# Patient Record
Sex: FEMALE | NOT HISPANIC OR LATINO | URBAN - METROPOLITAN AREA
[De-identification: names, ages, dates, MRNs, and addresses within clinical notes are randomized per-mention and may not be internally consistent; named-entity substitution may affect disease eponyms.]

---

## 2018-03-06 ENCOUNTER — INPATIENT (INPATIENT)
Facility: HOSPITAL | Age: 48
LOS: 1 days | Discharge: HOME | End: 2018-03-08
Attending: SURGERY | Admitting: SURGERY
Payer: COMMERCIAL

## 2018-03-06 ENCOUNTER — TRANSCRIPTION ENCOUNTER (OUTPATIENT)
Age: 48
End: 2018-03-06

## 2018-03-06 ENCOUNTER — RESULT REVIEW (OUTPATIENT)
Age: 48
End: 2018-03-06

## 2018-03-06 VITALS
TEMPERATURE: 98 F | RESPIRATION RATE: 17 BRPM | HEART RATE: 105 BPM | SYSTOLIC BLOOD PRESSURE: 148 MMHG | OXYGEN SATURATION: 100 % | DIASTOLIC BLOOD PRESSURE: 78 MMHG

## 2018-03-06 DIAGNOSIS — Z98.890 OTHER SPECIFIED POSTPROCEDURAL STATES: Chronic | ICD-10-CM

## 2018-03-06 DIAGNOSIS — K37 UNSPECIFIED APPENDICITIS: ICD-10-CM

## 2018-03-06 LAB
ALBUMIN SERPL ELPH-MCNC: 4 G/DL — SIGNIFICANT CHANGE UP (ref 3.3–5)
ALP SERPL-CCNC: 79 U/L — SIGNIFICANT CHANGE UP (ref 40–120)
ALT FLD-CCNC: 13 U/L — SIGNIFICANT CHANGE UP (ref 4–33)
APPEARANCE UR: SIGNIFICANT CHANGE UP
APTT BLD: 30.1 SEC — SIGNIFICANT CHANGE UP (ref 27.5–37.4)
AST SERPL-CCNC: 16 U/L — SIGNIFICANT CHANGE UP (ref 4–32)
BASE EXCESS BLDV CALC-SCNC: 4.4 MMOL/L — SIGNIFICANT CHANGE UP
BASOPHILS # BLD AUTO: 0.07 K/UL — SIGNIFICANT CHANGE UP (ref 0–0.2)
BASOPHILS NFR BLD AUTO: 0.5 % — SIGNIFICANT CHANGE UP (ref 0–2)
BILIRUB SERPL-MCNC: 0.7 MG/DL — SIGNIFICANT CHANGE UP (ref 0.2–1.2)
BILIRUB UR-MCNC: NEGATIVE — SIGNIFICANT CHANGE UP
BLD GP AB SCN SERPL QL: NEGATIVE — SIGNIFICANT CHANGE UP
BLOOD GAS VENOUS - CREATININE: 0.73 MG/DL — SIGNIFICANT CHANGE UP (ref 0.5–1.3)
BLOOD UR QL VISUAL: HIGH
BUN SERPL-MCNC: 9 MG/DL — SIGNIFICANT CHANGE UP (ref 7–23)
CALCIUM SERPL-MCNC: 9.5 MG/DL — SIGNIFICANT CHANGE UP (ref 8.4–10.5)
CHLORIDE BLDV-SCNC: 103 MMOL/L — SIGNIFICANT CHANGE UP (ref 96–108)
CHLORIDE SERPL-SCNC: 99 MMOL/L — SIGNIFICANT CHANGE UP (ref 98–107)
CO2 SERPL-SCNC: 26 MMOL/L — SIGNIFICANT CHANGE UP (ref 22–31)
COLOR SPEC: YELLOW — SIGNIFICANT CHANGE UP
CREAT SERPL-MCNC: 0.74 MG/DL — SIGNIFICANT CHANGE UP (ref 0.5–1.3)
EOSINOPHIL # BLD AUTO: 0.07 K/UL — SIGNIFICANT CHANGE UP (ref 0–0.5)
EOSINOPHIL NFR BLD AUTO: 0.5 % — SIGNIFICANT CHANGE UP (ref 0–6)
GAS PNL BLDV: 135 MMOL/L — LOW (ref 136–146)
GLUCOSE BLDV-MCNC: 87 — SIGNIFICANT CHANGE UP (ref 70–99)
GLUCOSE SERPL-MCNC: 88 MG/DL — SIGNIFICANT CHANGE UP (ref 70–99)
GLUCOSE UR-MCNC: NEGATIVE — SIGNIFICANT CHANGE UP
HCO3 BLDV-SCNC: 27 MMOL/L — SIGNIFICANT CHANGE UP (ref 20–27)
HCT VFR BLD CALC: 32.9 % — LOW (ref 34.5–45)
HCT VFR BLDV CALC: 31.8 % — LOW (ref 34.5–45)
HGB BLD-MCNC: 10.1 G/DL — LOW (ref 11.5–15.5)
HGB BLDV-MCNC: 10.3 G/DL — LOW (ref 11.5–15.5)
HYALINE CASTS # UR AUTO: SIGNIFICANT CHANGE UP (ref 0–?)
IMM GRANULOCYTES # BLD AUTO: 0.06 # — SIGNIFICANT CHANGE UP
IMM GRANULOCYTES NFR BLD AUTO: 0.4 % — SIGNIFICANT CHANGE UP (ref 0–1.5)
INR BLD: 1.15 — SIGNIFICANT CHANGE UP (ref 0.88–1.17)
KETONES UR-MCNC: NEGATIVE — SIGNIFICANT CHANGE UP
LACTATE BLDV-MCNC: 1.1 MMOL/L — SIGNIFICANT CHANGE UP (ref 0.5–2)
LEUKOCYTE ESTERASE UR-ACNC: SIGNIFICANT CHANGE UP
LIDOCAIN IGE QN: 32.4 U/L — SIGNIFICANT CHANGE UP (ref 7–60)
LYMPHOCYTES # BLD AUTO: 1.46 K/UL — SIGNIFICANT CHANGE UP (ref 1–3.3)
LYMPHOCYTES # BLD AUTO: 10.6 % — LOW (ref 13–44)
MCHC RBC-ENTMCNC: 24.6 PG — LOW (ref 27–34)
MCHC RBC-ENTMCNC: 30.7 % — LOW (ref 32–36)
MCV RBC AUTO: 80 FL — SIGNIFICANT CHANGE UP (ref 80–100)
MONOCYTES # BLD AUTO: 1.14 K/UL — HIGH (ref 0–0.9)
MONOCYTES NFR BLD AUTO: 8.3 % — SIGNIFICANT CHANGE UP (ref 2–14)
MUCOUS THREADS # UR AUTO: SIGNIFICANT CHANGE UP
NEUTROPHILS # BLD AUTO: 10.91 K/UL — HIGH (ref 1.8–7.4)
NEUTROPHILS NFR BLD AUTO: 79.7 % — HIGH (ref 43–77)
NITRITE UR-MCNC: NEGATIVE — SIGNIFICANT CHANGE UP
NON-SQ EPI CELLS # UR AUTO: <1 — SIGNIFICANT CHANGE UP
NRBC # FLD: 0 — SIGNIFICANT CHANGE UP
PCO2 BLDV: 52 MMHG — HIGH (ref 41–51)
PH BLDV: 7.37 PH — SIGNIFICANT CHANGE UP (ref 7.32–7.43)
PH UR: 6 — SIGNIFICANT CHANGE UP (ref 4.6–8)
PLATELET # BLD AUTO: 308 K/UL — SIGNIFICANT CHANGE UP (ref 150–400)
PMV BLD: SIGNIFICANT CHANGE UP FL (ref 7–13)
PO2 BLDV: < 24 MMHG — LOW (ref 35–40)
POTASSIUM BLDV-SCNC: 4 MMOL/L — SIGNIFICANT CHANGE UP (ref 3.4–4.5)
POTASSIUM SERPL-MCNC: 4.3 MMOL/L — SIGNIFICANT CHANGE UP (ref 3.5–5.3)
POTASSIUM SERPL-SCNC: 4.3 MMOL/L — SIGNIFICANT CHANGE UP (ref 3.5–5.3)
PROT SERPL-MCNC: 7.7 G/DL — SIGNIFICANT CHANGE UP (ref 6–8.3)
PROT UR-MCNC: 100 MG/DL — HIGH
PROTHROM AB SERPL-ACNC: 13.3 SEC — HIGH (ref 9.8–13.1)
RBC # BLD: 4.11 M/UL — SIGNIFICANT CHANGE UP (ref 3.8–5.2)
RBC # FLD: 19.2 % — HIGH (ref 10.3–14.5)
RBC CASTS # UR COMP ASSIST: SIGNIFICANT CHANGE UP (ref 0–?)
RH IG SCN BLD-IMP: POSITIVE — SIGNIFICANT CHANGE UP
SAO2 % BLDV: 32.7 % — LOW (ref 60–85)
SODIUM SERPL-SCNC: 138 MMOL/L — SIGNIFICANT CHANGE UP (ref 135–145)
SP GR SPEC: 1.03 — SIGNIFICANT CHANGE UP (ref 1–1.04)
SQUAMOUS # UR AUTO: SIGNIFICANT CHANGE UP
URATE CRY FLD QL MICRO: SIGNIFICANT CHANGE UP (ref 0–0)
UROBILINOGEN FLD QL: NORMAL MG/DL — SIGNIFICANT CHANGE UP
WBC # BLD: 13.71 K/UL — HIGH (ref 3.8–10.5)
WBC # FLD AUTO: 13.71 K/UL — HIGH (ref 3.8–10.5)
WBC UR QL: HIGH (ref 0–?)

## 2018-03-06 PROCEDURE — 44970 LAPAROSCOPY APPENDECTOMY: CPT | Mod: GC

## 2018-03-06 PROCEDURE — 99221 1ST HOSP IP/OBS SF/LOW 40: CPT | Mod: 57,25,GC

## 2018-03-06 RX ORDER — ONDANSETRON 8 MG/1
4 TABLET, FILM COATED ORAL ONCE
Qty: 0 | Refills: 0 | Status: COMPLETED | OUTPATIENT
Start: 2018-03-06 | End: 2018-03-06

## 2018-03-06 RX ORDER — SODIUM CHLORIDE 9 MG/ML
1000 INJECTION INTRAMUSCULAR; INTRAVENOUS; SUBCUTANEOUS ONCE
Qty: 0 | Refills: 0 | Status: COMPLETED | OUTPATIENT
Start: 2018-03-06 | End: 2018-03-06

## 2018-03-06 RX ORDER — AMLODIPINE BESYLATE 2.5 MG/1
1 TABLET ORAL
Qty: 0 | Refills: 0 | COMMUNITY

## 2018-03-06 RX ORDER — CEFOTETAN DISODIUM 1 G
2 VIAL (EA) INJECTION EVERY 12 HOURS
Qty: 0 | Refills: 0 | Status: DISCONTINUED | OUTPATIENT
Start: 2018-03-06 | End: 2018-03-07

## 2018-03-06 RX ORDER — CEFOTETAN DISODIUM 1 G
1 VIAL (EA) INJECTION ONCE
Qty: 0 | Refills: 0 | Status: COMPLETED | OUTPATIENT
Start: 2018-03-06 | End: 2018-03-06

## 2018-03-06 RX ORDER — MORPHINE SULFATE 50 MG/1
4 CAPSULE, EXTENDED RELEASE ORAL ONCE
Qty: 0 | Refills: 0 | Status: DISCONTINUED | OUTPATIENT
Start: 2018-03-06 | End: 2018-03-06

## 2018-03-06 RX ORDER — AMLODIPINE BESYLATE 2.5 MG/1
2.5 TABLET ORAL DAILY
Qty: 0 | Refills: 0 | Status: DISCONTINUED | OUTPATIENT
Start: 2018-03-06 | End: 2018-03-08

## 2018-03-06 RX ORDER — ENOXAPARIN SODIUM 100 MG/ML
40 INJECTION SUBCUTANEOUS DAILY
Qty: 0 | Refills: 0 | Status: DISCONTINUED | OUTPATIENT
Start: 2018-03-06 | End: 2018-03-08

## 2018-03-06 RX ORDER — SODIUM CHLORIDE 9 MG/ML
1000 INJECTION, SOLUTION INTRAVENOUS
Qty: 0 | Refills: 0 | Status: DISCONTINUED | OUTPATIENT
Start: 2018-03-06 | End: 2018-03-07

## 2018-03-06 RX ADMIN — ONDANSETRON 4 MILLIGRAM(S): 8 TABLET, FILM COATED ORAL at 16:11

## 2018-03-06 RX ADMIN — Medication 100 GRAM(S): at 18:22

## 2018-03-06 RX ADMIN — MORPHINE SULFATE 4 MILLIGRAM(S): 50 CAPSULE, EXTENDED RELEASE ORAL at 16:11

## 2018-03-06 RX ADMIN — SODIUM CHLORIDE 1000 MILLILITER(S): 9 INJECTION INTRAMUSCULAR; INTRAVENOUS; SUBCUTANEOUS at 16:11

## 2018-03-06 RX ADMIN — SODIUM CHLORIDE 100 MILLILITER(S): 9 INJECTION, SOLUTION INTRAVENOUS at 22:54

## 2018-03-06 NOTE — H&P ADULT - HISTORY OF PRESENT ILLNESS
48F with newly diagnosed hypertension presents with abdominal pain since Saturday night. Patient thought she got a GI bug from a bridal shower and had vomiting and diffuse abdominal pain. On Sunday the vomiting improved and the pain localized to the right lower quadrant. It was not associated with fevers or chills. [facility] and was sent for a CT scan. She was called today to be informed of the results of the CT scan and came to the ED.   Patient has never had this pain before. Had a hysterectomy years ago for fibroids.

## 2018-03-06 NOTE — ED ADULT TRIAGE NOTE - CHIEF COMPLAINT QUOTE
pt c/o RLQ pain since Saturday. went to [facility] and had a CT preformed, arrived with CD that has results, unsure of the results herself. denies urinary issues.

## 2018-03-06 NOTE — ED PROVIDER NOTE - ATTENDING CONTRIBUTION TO CARE
48F with hx hysterectomy p/w RLQ pain x a few days. Also with few episodes of nbnb emesis. Pain severe, RLQ, constant, no alleviating or exacerbating factors. Seen at urgent care and had CT performed, but pt does not know results, has disc. Denies diarrhea, melena, hematochezia. Denies dysuria, hematuria. Denies vag d/c or bleeding. Denies cp, sob, f/c.    PE: NAD, NCAT, MMM, Trachea midline, Normal conjunctiva, lungs CTAB, S1/S2 RRR, Normal perfusion, Abdomen Soft, +RLQ ttp with ttp over mcburney's point, No rebound/guarding, No LE edema, No deformity of extremities, No rashes,  No focal motor or sensory deficits.    48F with RLQ pain x a few days. WIth RLQ ttp, most concerning for appendicitis. Also consider diverticulitis, colitis, or other intra-abd pathology. Much lower suspicion  or GYN pathology. Pt has CT with her, will upload and ask radiology to read. To obtain pre-op labs for likely appendicitis, give abx as necessary, analgesia and antiemetics as necessary. Surgery consult as necessary. Will likely require admission. - Galdino Magdaleno MD

## 2018-03-06 NOTE — ED ADULT NURSE NOTE - OBJECTIVE STATEMENT
Pt c/o RLQ pain x 3 days. In NAD. C/o nausea. Denies vomiting/diarrhea today. Denies urinary symptoms, fever/chills. Hx: Hysterectomy. Urine appears dark in color. Evaluated by MD. Labs drawn & sent. Will continue to monitor.

## 2018-03-06 NOTE — ED PROVIDER NOTE - OBJECTIVE STATEMENT
48 year-old female with history of hysterectomy p/w RLQ abdominal pain x 3 days.  Non-radiating; not affected by PO intake.  Started with repeated episodes of NBNB emesis.  Went to UC yesterday - had a CT scan done and the patient does not have the results.  No fevers, chills, chest pain, shortness of breath, dysuria.  No history of kidney stones; has not had pain like this before.  Having regular BM - no diarrhea or melena.  Has not taken anything for the pain.

## 2018-03-06 NOTE — ED ADULT NURSE NOTE - CHIEF COMPLAINT QUOTE
pt c/o RLQ pain since Saturday. went to [facility] and had a CT preformed, arrived with CD that has results, unsure of the results herself. denies urinary issues.

## 2018-03-06 NOTE — H&P ADULT - NSHPPHYSICALEXAM_GEN_ALL_CORE
General: WN/WD NAD  Neurology: A&Ox3, nonfocal, SHERMAN x 4  Head:  Normocephalic, atraumatic  ENT:  Mucosa moist, no ulcerations  Neck:  Supple, no sinuses or palpable masses  Lymphatic:  No palpable cervical, supraclavicular, axillary or inguinal adenopathy  Respiratory: CTA B/L  CV: RRR, S1S2, no murmur  Abdominal: Obese, Soft, RLQ pain   MSK: No edema, + peripheral pulses, FROM all 4 extremity

## 2018-03-06 NOTE — ED PROVIDER NOTE - MEDICAL DECISION MAKING DETAILS
48 year-old female with history of hysterectomy p/w RLQ abdominal pain x 3 days likely appendicitis.  DDX: ovarian torsion, kidney stone, ovarian fibroid, UTI/pyelo.  Plan to upload outpatient imaging for evaluation.  Ordering basic labs, pain control, fluids and reassess.

## 2018-03-06 NOTE — H&P ADULT - ASSESSMENT
48F presents with acute appendicitis    -cefotetan  -NPO/IVF  -added on to OR for tonight  discussed with Dr. Glynn

## 2018-03-06 NOTE — ED PROVIDER NOTE - PROGRESS NOTE DETAILS
Harsha COONEY: CT scan shows presence of appendicitis; surg consulted and admitted for further management

## 2018-03-06 NOTE — ED PROVIDER NOTE - PHYSICAL EXAMINATION
*Gen: NAD, AAO*3  *HEENT: NC/AT, MMM, airway patent, trachea midline  *CV: RRR, S1/S2 present, no murmurs/rubs/gallops  *Resp: no respiratory distress, LCTAB, no wheezing/rales/rhonchi  *Abd: non-distended, TTP RLQ  *Neuro: no focal neuro deficits, moving all limbs appropriately  *Extremities: no gross deformity  *Skin: no rashes, no wounds   ~ Arturo Mcleod M.D.

## 2018-03-07 ENCOUNTER — TRANSCRIPTION ENCOUNTER (OUTPATIENT)
Age: 48
End: 2018-03-07

## 2018-03-07 LAB — SPECIMEN SOURCE: SIGNIFICANT CHANGE UP

## 2018-03-07 PROCEDURE — 88304 TISSUE EXAM BY PATHOLOGIST: CPT | Mod: 26

## 2018-03-07 RX ORDER — ACETAMINOPHEN 500 MG
650 TABLET ORAL EVERY 6 HOURS
Qty: 0 | Refills: 0 | Status: DISCONTINUED | OUTPATIENT
Start: 2018-03-07 | End: 2018-03-08

## 2018-03-07 RX ORDER — HYDROMORPHONE HYDROCHLORIDE 2 MG/ML
0.5 INJECTION INTRAMUSCULAR; INTRAVENOUS; SUBCUTANEOUS
Qty: 0 | Refills: 0 | Status: DISCONTINUED | OUTPATIENT
Start: 2018-03-07 | End: 2018-03-07

## 2018-03-07 RX ORDER — FENTANYL CITRATE 50 UG/ML
50 INJECTION INTRAVENOUS
Qty: 0 | Refills: 0 | Status: DISCONTINUED | OUTPATIENT
Start: 2018-03-07 | End: 2018-03-07

## 2018-03-07 RX ORDER — CIPROFLOXACIN LACTATE 400MG/40ML
500 VIAL (ML) INTRAVENOUS EVERY 12 HOURS
Qty: 0 | Refills: 0 | Status: DISCONTINUED | OUTPATIENT
Start: 2018-03-07 | End: 2018-03-08

## 2018-03-07 RX ORDER — METRONIDAZOLE 500 MG
500 TABLET ORAL EVERY 8 HOURS
Qty: 0 | Refills: 0 | Status: DISCONTINUED | OUTPATIENT
Start: 2018-03-07 | End: 2018-03-08

## 2018-03-07 RX ORDER — DOCUSATE SODIUM 100 MG
100 CAPSULE ORAL THREE TIMES A DAY
Qty: 0 | Refills: 0 | Status: DISCONTINUED | OUTPATIENT
Start: 2018-03-07 | End: 2018-03-08

## 2018-03-07 RX ORDER — CIPROFLOXACIN LACTATE 400MG/40ML
1 VIAL (ML) INTRAVENOUS
Qty: 8 | Refills: 0 | OUTPATIENT
Start: 2018-03-07 | End: 2018-03-10

## 2018-03-07 RX ORDER — OXYCODONE AND ACETAMINOPHEN 5; 325 MG/1; MG/1
1 TABLET ORAL EVERY 6 HOURS
Qty: 0 | Refills: 0 | Status: DISCONTINUED | OUTPATIENT
Start: 2018-03-07 | End: 2018-03-08

## 2018-03-07 RX ORDER — DOCUSATE SODIUM 100 MG
1 CAPSULE ORAL
Qty: 0 | Refills: 0 | COMMUNITY
Start: 2018-03-07

## 2018-03-07 RX ORDER — OXYCODONE HYDROCHLORIDE 5 MG/1
5 TABLET ORAL EVERY 4 HOURS
Qty: 0 | Refills: 0 | Status: DISCONTINUED | OUTPATIENT
Start: 2018-03-07 | End: 2018-03-07

## 2018-03-07 RX ORDER — METRONIDAZOLE 500 MG
1 TABLET ORAL
Qty: 12 | Refills: 0 | OUTPATIENT
Start: 2018-03-07 | End: 2018-03-10

## 2018-03-07 RX ORDER — ONDANSETRON 8 MG/1
4 TABLET, FILM COATED ORAL ONCE
Qty: 0 | Refills: 0 | Status: DISCONTINUED | OUTPATIENT
Start: 2018-03-07 | End: 2018-03-07

## 2018-03-07 RX ADMIN — OXYCODONE AND ACETAMINOPHEN 1 TABLET(S): 5; 325 TABLET ORAL at 06:00

## 2018-03-07 RX ADMIN — OXYCODONE AND ACETAMINOPHEN 1 TABLET(S): 5; 325 TABLET ORAL at 13:40

## 2018-03-07 RX ADMIN — AMLODIPINE BESYLATE 2.5 MILLIGRAM(S): 2.5 TABLET ORAL at 05:40

## 2018-03-07 RX ADMIN — Medication 500 MILLIGRAM(S): at 17:05

## 2018-03-07 RX ADMIN — Medication 500 MILLIGRAM(S): at 05:40

## 2018-03-07 RX ADMIN — OXYCODONE AND ACETAMINOPHEN 1 TABLET(S): 5; 325 TABLET ORAL at 14:40

## 2018-03-07 RX ADMIN — SODIUM CHLORIDE 100 MILLILITER(S): 9 INJECTION, SOLUTION INTRAVENOUS at 03:28

## 2018-03-07 RX ADMIN — Medication 500 MILLIGRAM(S): at 13:40

## 2018-03-07 RX ADMIN — Medication 100 MILLIGRAM(S): at 13:40

## 2018-03-07 RX ADMIN — Medication 100 MILLIGRAM(S): at 05:40

## 2018-03-07 RX ADMIN — OXYCODONE AND ACETAMINOPHEN 1 TABLET(S): 5; 325 TABLET ORAL at 05:44

## 2018-03-07 RX ADMIN — ENOXAPARIN SODIUM 40 MILLIGRAM(S): 100 INJECTION SUBCUTANEOUS at 13:40

## 2018-03-07 RX ADMIN — Medication 500 MILLIGRAM(S): at 22:16

## 2018-03-07 RX ADMIN — Medication 500 MILLIGRAM(S): at 05:39

## 2018-03-07 RX ADMIN — Medication 100 MILLIGRAM(S): at 22:16

## 2018-03-07 NOTE — PROGRESS NOTE ADULT - SUBJECTIVE AND OBJECTIVE BOX
Team B SURGERY PROGRESS NOTE    POST OPERATIVE DAY #: 1      SUBJECTIVE: Pt seen at examined at bedside. AVSS. No acute events overnight. Pain well controlled. OOB and ambulating. Denies fever, CP, SOB, and NV. Tolerating regular diet         Vital Signs Last 24 Hrs  T(C): 36.7 (07 Mar 2018 09:35), Max: 37.3 (06 Mar 2018 19:52)  T(F): 98.1 (07 Mar 2018 09:35), Max: 99.2 (06 Mar 2018 19:52)  HR: 82 (07 Mar 2018 09:35) (78 - 105)  BP: 125/76 (07 Mar 2018 09:35) (115/70 - 148/78)  BP(mean): --  RR: 18 (07 Mar 2018 09:35) (12 - 22)  SpO2: 96% (07 Mar 2018 09:35) (95% - 100%)    Physical Exam  General: awake, alert, NAD    Pulm: respirations unlabored, no increased WOB  Abdomen: soft, mildly distended, NT, incision c/d/i  Extremities: Grossly symmetric    I&O's Summary    06 Mar 2018 07:  -  07 Mar 2018 07:00  --------------------------------------------------------  IN: 750 mL / OUT: 1100 mL / NET: -350 mL      I&O's Detail    06 Mar 2018 07:  -  07 Mar 2018 07:00  --------------------------------------------------------  IN:    lactated ringers.: 600 mL    Oral Fluid: 150 mL  Total IN: 750 mL    OUT:    Voided: 1100 mL  Total OUT: 1100 mL    Total NET: -350 mL          MEDICATIONS  (STANDING):  amLODIPine   Tablet 2.5 milliGRAM(s) Oral daily  ciprofloxacin     Tablet 500 milliGRAM(s) Oral every 12 hours  docusate sodium 100 milliGRAM(s) Oral three times a day  enoxaparin Injectable 40 milliGRAM(s) SubCutaneous daily  metroNIDAZOLE    Tablet 500 milliGRAM(s) Oral every 8 hours    MEDICATIONS  (PRN):  oxyCODONE    5 mG/acetaminophen 325 mG 1 Tablet(s) Oral every 6 hours PRN Severe Pain (7 - 10)      LABS:                        10.1   13.71 )-----------( 308      ( 06 Mar 2018 15:59 )             32.9     03-06    138  |  99  |  9   ----------------------------<  88  4.3   |  26  |  0.74    Ca    9.5      06 Mar 2018 15:59    TPro  7.7  /  Alb  4.0  /  TBili  0.7  /  DBili  x   /  AST  16  /  ALT  13  /  AlkPhos  79  03-06    PT/INR - ( 06 Mar 2018 15:59 )   PT: 13.3 SEC;   INR: 1.15          PTT - ( 06 Mar 2018 15:59 )  PTT:30.1 SEC  Urinalysis Basic - ( 06 Mar 2018 15:59 )    Color: YELLOW / Appearance: HAZY / S.030 / pH: 6.0  Gluc: NEGATIVE / Ketone: NEGATIVE  / Bili: NEGATIVE / Urobili: NORMAL mg/dL   Blood: SMALL / Protein: 100 mg/dL / Nitrite: NEGATIVE   Leuk Esterase: TRACE / RBC: 10-25 / WBC 5-10   Sq Epi: OCC / Non Sq Epi: x / Bacteria: x        RADIOLOGY & ADDITIONAL STUDIES:  no new studies

## 2018-03-07 NOTE — DISCHARGE NOTE ADULT - MEDICATION SUMMARY - MEDICATIONS TO TAKE
I will START or STAY ON the medications listed below when I get home from the hospital:    metroNIDAZOLE 500 mg oral tablet  -- 1 tab(s) by mouth every 8 hours  -- Indication: For Appendicitis    oxyCODONE-acetaminophen 5 mg-325 mg oral tablet  -- 1 tab(s) by mouth every 4 to 6 hours, As Needed -Severe Pain (7 - 10) MDD:6   -- Indication: For Post op Pain    amLODIPine 2.5 mg oral tablet  -- 1 tab(s) by mouth once a day  -- Indication: For HTN    docusate sodium 100 mg oral capsule  -- 1 cap(s) by mouth 3 times a day  -- Indication: For Stool softener    ciprofloxacin 500 mg oral tablet  -- 1 tab(s) by mouth every 12 hours  -- Indication: For Appendicitis

## 2018-03-07 NOTE — DISCHARGE NOTE ADULT - HOSPITAL COURSE
48F with newly diagnosed hypertension presents with abdominal pain since Saturday night. Patient thought she got a GI bug from a bridal shower and had vomiting and diffuse abdominal pain. On Sunday the vomiting improved and the pain localized to the right lower quadrant. It was not associated with fevers or chills. [facility] and was sent for a CT scan. She was called today to be informed of the results of the CT scan and came to the ED which revealed appendicitis.    Patient has never had this pain before. Had a hysterectomy years ago for fibroids.     Patient was taken to the OR and is status post laparoscopic appendectomy.  Appendix found to be inflamed with pus but not perforated.     Patient did well post operatively.  She is tolerating diet and pain is well controlled. She is ambulating and voiding without difficulty.    Patient is stable for discharge home with pain medication and antibiotics.

## 2018-03-07 NOTE — DISCHARGE NOTE ADULT - PATIENT PORTAL LINK FT
You can access the [facility] Patient Portal, offered by [facility], by registering with the following website: [email or web address]

## 2018-03-07 NOTE — PROGRESS NOTE ADULT - ASSESSMENT
48F s/p lap appendectomy POD#1, doing well    - regular diet   - VTE ppx  - discharge today on Augmentin 4 days    [id number]

## 2018-03-07 NOTE — DISCHARGE NOTE ADULT - PLAN OF CARE
Continue current treatment status post laparoscopic appendectomy Please call  for follow up appointment in 1-2 weeks with Dr Glynn  -Please allow steri-strips to fall off on their own.  Ok to shower and rinse wound with warm soapy water.  Do not scrub wound, pat dry.   Please call the doctor immediately if you develop fever, chills, inability to tolerate liquid or food, diarrhea, nausea, vomiting or increased abdominal pain. Follow a regular diet. Do not drive or operate machinery while taking narcotic pain medication.   Please follow up with your surgeon and primary care doctor within 1 week of discharge. WOUND CARE: Please allow steri-strips to fall off on their own.   BATHING: Please do not submerge wound underwater. You may shower and/or sponge bathe. It is OK to wash drain wound site.  ACTIVITY: No heavy lifting or straining. Otherwise, you may return to your usual level of physical activity. If you are taking narcotic pain medication (such as Percocet) DO NOT drive a car, operate machinery or make important decisions.  DIET: Return to your usual diet.  NOTIFY YOUR SURGEON IF: You have any bleeding that does not stop, any pus draining from your wound(s), any fever (over 100.4 F) or chills, persistent nausea/vomiting, persistent diarrhea, or if your pain is not controlled on your discharge pain medications.  FOLLOW-UP: Please follow up with your primary care physician in one week regarding your hospitalization Please call  for follow up appointment in 1 week with Dr. Glynn. Please follow up with your primary care physician regarding your hospitalization

## 2018-03-07 NOTE — DISCHARGE NOTE ADULT - CARE PLAN
Principal Discharge DX:	Appendicitis  Goal:	status post laparoscopic appendectomy  Assessment and plan of treatment:	Please call  for follow up appointment in 1-2 weeks with Dr Glynn  -Please allow steri-strips to fall off on their own.  Ok to shower and rinse wound with warm soapy water.  Do not scrub wound, pat dry.   Please call the doctor immediately if you develop fever, chills, inability to tolerate liquid or food, diarrhea, nausea, vomiting or increased abdominal pain. Follow a regular diet. Do not drive or operate machinery while taking narcotic pain medication.   Please follow up with your surgeon and primary care doctor within 1 week of discharge.  Secondary Diagnosis:	Hypertension  Goal:	Continue current treatment Principal Discharge DX:	Appendicitis  Goal:	status post laparoscopic appendectomy  Assessment and plan of treatment:	WOUND CARE: Please allow steri-strips to fall off on their own.   BATHING: Please do not submerge wound underwater. You may shower and/or sponge bathe. It is OK to wash drain wound site.  ACTIVITY: No heavy lifting or straining. Otherwise, you may return to your usual level of physical activity. If you are taking narcotic pain medication (such as Percocet) DO NOT drive a car, operate machinery or make important decisions.  DIET: Return to your usual diet.  NOTIFY YOUR SURGEON IF: You have any bleeding that does not stop, any pus draining from your wound(s), any fever (over 100.4 F) or chills, persistent nausea/vomiting, persistent diarrhea, or if your pain is not controlled on your discharge pain medications.  FOLLOW-UP: Please follow up with your primary care physician in one week regarding your hospitalization Please call  for follow up appointment in 1 week with Dr. Glynn.  Secondary Diagnosis:	Hypertension  Goal:	Continue current treatment  Assessment and plan of treatment:	Please follow up with your primary care physician regarding your hospitalization

## 2018-03-07 NOTE — DISCHARGE NOTE ADULT - NS AS ACTIVITY OBS
Showering allowed/Stairs allowed/No Heavy lifting/straining/Do not make important decisions/Do not drive or operate machinery/Do not drive while taking pain medications./Walking-Indoors allowed/Walking-Outdoors allowed

## 2018-03-08 VITALS
DIASTOLIC BLOOD PRESSURE: 78 MMHG | HEART RATE: 81 BPM | RESPIRATION RATE: 17 BRPM | SYSTOLIC BLOOD PRESSURE: 146 MMHG | TEMPERATURE: 98 F | OXYGEN SATURATION: 96 %

## 2018-03-08 LAB — BACTERIA UR CULT: SIGNIFICANT CHANGE UP

## 2018-03-08 RX ORDER — CIPROFLOXACIN LACTATE 400MG/40ML
1 VIAL (ML) INTRAVENOUS
Qty: 6 | Refills: 0 | OUTPATIENT
Start: 2018-03-08 | End: 2018-03-10

## 2018-03-08 RX ORDER — METRONIDAZOLE 500 MG
1 TABLET ORAL
Qty: 9 | Refills: 0 | OUTPATIENT
Start: 2018-03-08 | End: 2018-03-10

## 2018-03-08 RX ADMIN — Medication 500 MILLIGRAM(S): at 13:07

## 2018-03-08 RX ADMIN — Medication 100 MILLIGRAM(S): at 06:31

## 2018-03-08 RX ADMIN — Medication 500 MILLIGRAM(S): at 06:31

## 2018-03-08 RX ADMIN — OXYCODONE AND ACETAMINOPHEN 1 TABLET(S): 5; 325 TABLET ORAL at 03:08

## 2018-03-08 RX ADMIN — OXYCODONE AND ACETAMINOPHEN 1 TABLET(S): 5; 325 TABLET ORAL at 04:08

## 2018-03-08 RX ADMIN — AMLODIPINE BESYLATE 2.5 MILLIGRAM(S): 2.5 TABLET ORAL at 06:31

## 2018-03-08 RX ADMIN — Medication 100 MILLIGRAM(S): at 13:07

## 2018-03-08 NOTE — PROGRESS NOTE ADULT - SUBJECTIVE AND OBJECTIVE BOX
B Team Paula Daily Progress Note    SUBJECTIVE: Pt seen this morning, no over night event, no acute complaint.     OBJECTIVE:   Vital Signs Last 24 Hrs  T(C): 37.1 (08 Mar 2018 01:15), Max: 37.1 (08 Mar 2018 01:15)  T(F): 98.7 (08 Mar 2018 01:15), Max: 98.7 (08 Mar 2018 01:15)  HR: 75 (08 Mar 2018 01:15) (64 - 87)  BP: 123/63 (08 Mar 2018 01:15) (118/65 - 149/74)  BP(mean): --  RR: 16 (08 Mar 2018 01:15) (16 - 18)  SpO2: 95% (08 Mar 2018 01:15) (95% - 97%)    PHYSICAL EXAM:  Constitutional: resting in bed with no acute distress  Respiratory:  unlabored breathing  Gastrointestinal: Abdomen soft, non distended, non tenderness, incision sites are clearn, dry and intact.   Genitourinary:  Normal.  Extremities:  No edema, no calf tenderrness,  Neurological: AxAxOx3    RADIOLOGY & ADDITIONAL STUDIES: no new studies performed B Team Paula Daily Progress Note    SUBJECTIVE: Pt seen this morning, no over night event, no acute complaint: pain adequately controlled with po med; passing gas with no nausea and tolerating diet    OBJECTIVE:   Vital Signs Last 24 Hrs  T(C): 37.1 (08 Mar 2018 01:15), Max: 37.1 (08 Mar 2018 01:15)  T(F): 98.7 (08 Mar 2018 01:15), Max: 98.7 (08 Mar 2018 01:15)  HR: 75 (08 Mar 2018 01:15) (64 - 87)  BP: 123/63 (08 Mar 2018 01:15) (118/65 - 149/74)  BP(mean): --  RR: 16 (08 Mar 2018 01:15) (16 - 18)  SpO2: 95% (08 Mar 2018 01:15) (95% - 97%)    PHYSICAL EXAM:  Constitutional: resting in bed with no acute distress  Respiratory:  unlabored breathing on room air   Gastrointestinal: Abdomen soft, non distended, appropriately  tenderness, stri-strips over incision sites are clearn, dry and intact.   Extremities:  No edema, no calf tenderrness,    RADIOLOGY & ADDITIONAL STUDIES: no new studies performed

## 2018-03-08 NOTE — PROGRESS NOTE ADULT - ASSESSMENT
48F s/p lap appendectomy POD#1, doing well    - regular diet   - VTE ppx  - discharge today on Augmentin 4 days    [id number] 48F s/p lap appendectomy POD#1, doing well    Plan:  - regular diet   - VTE ppx  - discharge today on Augmentin 4 days 48F s/p lap appendectomy POD#2, doing well: pain adequately controlled, ambulating, passing gas, tolerating regular food with no nausea or pain    Plan:  - regular diet   - VTE ppx  - discharge today on cipro and flagyl for 4 days

## 2018-03-16 PROBLEM — I10 ESSENTIAL (PRIMARY) HYPERTENSION: Chronic | Status: ACTIVE | Noted: 2018-03-06

## 2018-03-16 PROBLEM — Z00.00 ENCOUNTER FOR PREVENTIVE HEALTH EXAMINATION: Status: ACTIVE | Noted: 2018-03-16

## 2018-03-19 ENCOUNTER — APPOINTMENT (OUTPATIENT)
Dept: TRAUMA SURGERY | Facility: CLINIC | Age: 48
End: 2018-03-19
Payer: COMMERCIAL

## 2018-03-19 ENCOUNTER — TRANSCRIPTION ENCOUNTER (OUTPATIENT)
Age: 48
End: 2018-03-19

## 2018-03-19 VITALS
SYSTOLIC BLOOD PRESSURE: 136 MMHG | TEMPERATURE: 98.1 F | DIASTOLIC BLOOD PRESSURE: 87 MMHG | WEIGHT: 211 LBS | HEIGHT: 66 IN | HEART RATE: 69 BPM | BODY MASS INDEX: 33.91 KG/M2

## 2018-03-19 DIAGNOSIS — Z98.890 OTHER SPECIFIED POSTPROCEDURAL STATES: ICD-10-CM

## 2018-03-19 DIAGNOSIS — K35.80 UNSPECIFIED ACUTE APPENDICITIS: ICD-10-CM

## 2018-03-19 DIAGNOSIS — Z82.49 FAMILY HISTORY OF ISCHEMIC HEART DISEASE AND OTHER DISEASES OF THE CIRCULATORY SYSTEM: ICD-10-CM

## 2018-03-19 PROCEDURE — 99024 POSTOP FOLLOW-UP VISIT: CPT

## 2018-03-21 PROBLEM — Z98.890 POST-OPERATIVE STATE: Status: ACTIVE | Noted: 2018-03-19

## 2018-03-21 PROBLEM — Z82.49 FAMILY HISTORY OF ESSENTIAL HYPERTENSION: Status: ACTIVE | Noted: 2018-03-19

## 2018-03-21 PROBLEM — K35.80 ACUTE APPENDICITIS: Status: RESOLVED | Noted: 2018-03-19 | Resolved: 2018-03-21

## 2020-08-14 NOTE — PACU DISCHARGE NOTE - NSPTMEETSDISCHCRITERIADT_GEN_A_CORE
Spoke with patient who took prescribed bactrim and has a stomach ache with a \"gurgling\" stomach.     She states she took the abx this morning after eating her breakfast (cheerios, orange juice and milk).     Was wondering if she could take a Tums.     Patient was advised to take tums and be sure to continue eating meals today (bland if needed) to help with stomach ache.     Please advise on further recommendations if any.    07-Mar-2018 02:23

## 2022-07-08 NOTE — DISCHARGE NOTE ADULT - CARE PROVIDER_API CALL
I reviewed the H&P, I examined the patient, and there are no changes in the patient's condition.   
Nataliia Glynn), DieticianNutrition; Surgery; Surgical Critical Care  [address] [facility]  [address] [address]  Phone: [phone]  Fax: [phone]

## 2023-06-26 NOTE — ED PROVIDER NOTE - CARE PLAN
[Time Spent: ___ minutes] : I have spent [unfilled] minutes of time on the encounter. [FreeTextEntry3] : Patient being seen as per physician's primary plan of care.\par  Principal Discharge DX:	Appendicitis

## 2024-07-15 ENCOUNTER — TRANSCRIPTION ENCOUNTER (OUTPATIENT)
Age: 54
End: 2024-07-15